# Patient Record
Sex: MALE | Race: BLACK OR AFRICAN AMERICAN | NOT HISPANIC OR LATINO | URBAN - METROPOLITAN AREA
[De-identification: names, ages, dates, MRNs, and addresses within clinical notes are randomized per-mention and may not be internally consistent; named-entity substitution may affect disease eponyms.]

---

## 2022-02-12 ENCOUNTER — APPOINTMENT (OUTPATIENT)
Dept: GENERAL RADIOLOGY | Facility: HOSPITAL | Age: 27
End: 2022-02-12

## 2022-02-12 ENCOUNTER — HOSPITAL ENCOUNTER (EMERGENCY)
Facility: HOSPITAL | Age: 27
Discharge: HOME | End: 2022-02-12
Attending: EMERGENCY MEDICINE | Admitting: EMERGENCY MEDICINE

## 2022-02-12 VITALS
TEMPERATURE: 97.8 F | HEART RATE: 67 BPM | SYSTOLIC BLOOD PRESSURE: 137 MMHG | RESPIRATION RATE: 16 BRPM | OXYGEN SATURATION: 97 % | DIASTOLIC BLOOD PRESSURE: 91 MMHG | HEIGHT: 71 IN

## 2022-02-12 DIAGNOSIS — S60.142A CONTUSION OF LEFT RING FINGER WITH DAMAGE TO NAIL, INITIAL ENCOUNTER: Primary | ICD-10-CM

## 2022-02-12 PROCEDURE — 90715 TDAP VACCINE 7 YRS/> IM: CPT | Performed by: EMERGENCY MEDICINE

## 2022-02-12 PROCEDURE — 73140 X-RAY EXAM OF FINGER(S): CPT

## 2022-02-12 PROCEDURE — 90471 IMMUNIZATION ADMIN: CPT | Performed by: EMERGENCY MEDICINE

## 2022-02-12 PROCEDURE — 99282 EMERGENCY DEPT VISIT SF MDM: CPT

## 2022-02-12 PROCEDURE — 25010000002 TETANUS-DIPHTH-ACELL PERTUSSIS 5-2.5-18.5 LF-MCG/0.5 SUSPENSION PREFILLED SYRINGE: Performed by: EMERGENCY MEDICINE

## 2022-02-12 RX ADMIN — TETANUS TOXOID, REDUCED DIPHTHERIA TOXOID AND ACELLULAR PERTUSSIS VACCINE, ADSORBED 0.5 ML: 5; 2.5; 8; 8; 2.5 SUSPENSION INTRAMUSCULAR at 20:42

## 2022-02-12 NOTE — ED TRIAGE NOTES
"Pt ambulatory to ED via PV. Pt reports he smashed L pinky in between metal at work. Pt reports finger was bleeding and bruised before his work wrapped it. Pt has no other complaints. Reports he \"wants to make sure I didn't pinch a nerve.\"   "

## 2022-02-13 NOTE — ED PROVIDER NOTES
EMERGENCY DEPARTMENT ENCOUNTER    Room Number:  06/06  Date of encounter:  2/12/2022  PCP: Provider, No Known  Historian: Patient      HPI:  Chief Complaint: Left pinky injury  A complete HPI/ROS/PMH/PSH/SH/FH are unobtainable due to: None    Context: Rae Mcclure is a 26 y.o. male who presents to the ED via private vehicle for evaluation after he got his left pinky caught in a metal roller at work.  Pain shot up into his elbow at that time.  Has an open wound near the fingernail on that finger.  Denies any other injuries or concerns at this time.  Unclear when his last tetanus was.      MEDICAL RECORD REVIEW    No medical allergies listed on chart review in epic    PAST MEDICAL HISTORY  Active Ambulatory Problems     Diagnosis Date Noted   • No Active Ambulatory Problems     Resolved Ambulatory Problems     Diagnosis Date Noted   • No Resolved Ambulatory Problems     No Additional Past Medical History         PAST SURGICAL HISTORY  No past surgical history on file.      FAMILY HISTORY  No family history on file.      SOCIAL HISTORY  Social History     Socioeconomic History   • Marital status: Single         ALLERGIES  Patient has no known allergies.        REVIEW OF SYSTEMS  Review of Systems     All systems reviewed and negative except for those discussed in HPI.       PHYSICAL EXAM    I have reviewed the triage vital signs and nursing notes.    ED Triage Vitals [02/12/22 1855]   Temp Heart Rate Resp BP SpO2   97.8 °F (36.6 °C) 67 16 160/84 97 %      Temp src Heart Rate Source Patient Position BP Location FiO2 (%)   -- Monitor -- -- --       Physical Exam  General: No acute distress, nontoxic  HEENT: EOMI  Pulm: Symmetric chest rise, nonlabored breathing  CV: Regular rate and rhythm  GI: Nondistended  MSK: Left fifth digit with a small superficial abrasion at the dorsal aspect of the nailbed of the left fifth digit, nail otherwise intact, capillary refill and sensation intact, range of motion intact  Skin: Warm,  dry  Neuro: Awake, alert, oriented x 4, moving all extremities, no focal deficits  Psych: Calm, cooperative    Vital signs and nursing notes reviewed.         N95, protective eye goggles, and gloves used during this encounter. Patient in surgical mask.      LAB RESULTS  No results found for this or any previous visit (from the past 24 hour(s)).      RADIOLOGY  XR Finger 2+ View Left    Result Date: 2/12/2022  Left fifth finger radiograph  HISTORY:Crush injury  TECHNIQUE: AP, lateral and oblique radiograph so the left fifth finger  COMPARISON:None      FINDINGS AND IMPRESSION: There is no fracture or dislocation. Mild soft tissue swelling is present about the fifth digit.  This report was finalized on 2/12/2022 7:44 PM by Dr. Viet Rivas M.D.        I ordered the above noted radiological studies. Reviewed by me.  See dictation for official radiology interpretation.      PROCEDURES    Procedures      MEDICATIONS GIVEN IN ER    Medications   Tetanus-Diphth-Acell Pertussis (BOOSTRIX) injection 0.5 mL (0.5 mL Intramuscular Given 2/12/22 2042)         PROGRESS, DATA ANALYSIS, CONSULTS, AND MEDICAL DECISION MAKING    All labs have been independently reviewed by me.  All radiology studies have been reviewed by me and discussed with radiologist dictating the report.   EKG's independently viewed and interpreted by me.  Discussion below represents my analysis of pertinent findings related to patient's condition, differential diagnosis, treatment plan and final disposition.    Initial concern for possible underlying fracture, the wound itself does not need any type of repair or intervention to the nail.    ED Course as of 02/12/22 2145   Sat Feb 12, 2022 1953 XR Finger 2+ View Left  Reviewed [DC]   1952 Injury to finger requires no repair at this time, no fracture.  Likely just soft tissue injury.  I did  him that the nail may eventually fall off depending on how severe the injury to the nailbed impact was.  Advised  him to follow-up with primary care physician as needed, ED return for worsening symptoms as needed.  And update tetanus today. [DC]      ED Course User Index  [DC] Yunier Kuhn MD       AS OF 21:45 EST VITALS:    BP - 137/91  HR - 67  TEMP - 97.8 °F (36.6 °C)  02 SATS - 97%        DIAGNOSIS  Final diagnoses:   Contusion of left ring finger with damage to nail, initial encounter         DISPOSITION  DISCHARGE    Patient discharged in stable condition.    Reviewed implications of results, diagnosis, meds, responsibility to follow up, warning signs and symptoms of possible worsening, potential complications and reasons to return to ER.    Patient/Family voiced understanding of above instructions.    Discussed plan for discharge, as there is no emergent indication for admission. Patient referred to primary care provider for BP management due to today's BP. Pt/family is agreeable and understands need for follow up and repeat testing.  Pt is aware that discharge does not mean that nothing is wrong but it indicates no emergency is present that requires admission and they must continue care with follow-up as given below or physician of their choice.     FOLLOW-UP  [facility]  [address]  [facility] [address] [phone]    As needed, If symptoms worsen    [facility] - [place] [address]  [phone]  Call   As needed         Medication List      No changes were made to your prescriptions during this visit.                    Yunier Kuhn MD  02/12/22 1669

## 2022-02-13 NOTE — ED NOTES
.RN wearing all appropriate PPE during entire encounter with patient.        Behringer, Catherine, WALTER  02/12/22 2038

## 2022-02-13 NOTE — DISCHARGE INSTRUCTIONS
Tylenol and ibuprofen for pain as needed, keep wounds clean and dry, physician follow-up as needed, ED return for worsening symptoms as needed.